# Patient Record
Sex: MALE | Race: BLACK OR AFRICAN AMERICAN | NOT HISPANIC OR LATINO | Employment: FULL TIME | URBAN - METROPOLITAN AREA
[De-identification: names, ages, dates, MRNs, and addresses within clinical notes are randomized per-mention and may not be internally consistent; named-entity substitution may affect disease eponyms.]

---

## 2018-04-25 ENCOUNTER — HOSPITAL ENCOUNTER (EMERGENCY)
Facility: HOSPITAL | Age: 63
Discharge: HOME | End: 2018-04-25
Attending: EMERGENCY MEDICINE
Payer: COMMERCIAL

## 2018-04-25 VITALS
WEIGHT: 220 LBS | BODY MASS INDEX: 28.23 KG/M2 | HEART RATE: 92 BPM | DIASTOLIC BLOOD PRESSURE: 63 MMHG | RESPIRATION RATE: 18 BRPM | TEMPERATURE: 99 F | SYSTOLIC BLOOD PRESSURE: 139 MMHG | OXYGEN SATURATION: 98 % | HEIGHT: 74 IN

## 2018-04-25 DIAGNOSIS — R09.81 SINUS CONGESTION: ICD-10-CM

## 2018-04-25 DIAGNOSIS — R53.1 WEAKNESS: ICD-10-CM

## 2018-04-25 DIAGNOSIS — G44.209 TENSION HEADACHE: Primary | ICD-10-CM

## 2018-04-25 LAB
ALBUMIN SERPL BCP-MCNC: 3.9 G/DL
ALP SERPL-CCNC: 133 U/L
ALT SERPL W/O P-5'-P-CCNC: 33 U/L
ANION GAP SERPL CALC-SCNC: 14 MMOL/L
AST SERPL-CCNC: 32 U/L
BASOPHILS # BLD AUTO: 0.02 K/UL
BASOPHILS NFR BLD: 0.2 %
BILIRUB SERPL-MCNC: 1.2 MG/DL
BUN SERPL-MCNC: 13 MG/DL
CALCIUM SERPL-MCNC: 9.6 MG/DL
CHLORIDE SERPL-SCNC: 97 MMOL/L
CO2 SERPL-SCNC: 24 MMOL/L
CREAT SERPL-MCNC: 1.4 MG/DL
DIFFERENTIAL METHOD: ABNORMAL
EOSINOPHIL # BLD AUTO: 0 K/UL
EOSINOPHIL NFR BLD: 0.1 %
ERYTHROCYTE [DISTWIDTH] IN BLOOD BY AUTOMATED COUNT: 12.7 %
EST. GFR  (AFRICAN AMERICAN): >60 ML/MIN/1.73 M^2
EST. GFR  (NON AFRICAN AMERICAN): 54 ML/MIN/1.73 M^2
GLUCOSE SERPL-MCNC: 90 MG/DL
HCT VFR BLD AUTO: 43.1 %
HGB BLD-MCNC: 14.7 G/DL
LYMPHOCYTES # BLD AUTO: 1.3 K/UL
LYMPHOCYTES NFR BLD: 14.3 %
MCH RBC QN AUTO: 32.6 PG
MCHC RBC AUTO-ENTMCNC: 34.1 G/DL
MCV RBC AUTO: 96 FL
MONOCYTES # BLD AUTO: 1 K/UL
MONOCYTES NFR BLD: 11.1 %
NEUTROPHILS # BLD AUTO: 6.6 K/UL
NEUTROPHILS NFR BLD: 74.3 %
PLATELET # BLD AUTO: 227 K/UL
PMV BLD AUTO: 9.5 FL
POTASSIUM SERPL-SCNC: 3.9 MMOL/L
PROT SERPL-MCNC: 7.6 G/DL
RBC # BLD AUTO: 4.51 M/UL
SODIUM SERPL-SCNC: 135 MMOL/L
WBC # BLD AUTO: 8.93 K/UL

## 2018-04-25 PROCEDURE — 99284 EMERGENCY DEPT VISIT MOD MDM: CPT

## 2018-04-25 PROCEDURE — 85025 COMPLETE CBC W/AUTO DIFF WBC: CPT

## 2018-04-25 PROCEDURE — 93010 ELECTROCARDIOGRAM REPORT: CPT | Mod: ,,, | Performed by: INTERNAL MEDICINE

## 2018-04-25 PROCEDURE — 80053 COMPREHEN METABOLIC PANEL: CPT

## 2018-04-25 PROCEDURE — 25000003 PHARM REV CODE 250: Performed by: EMERGENCY MEDICINE

## 2018-04-25 RX ORDER — BUTALBITAL, ACETAMINOPHEN AND CAFFEINE 50; 325; 40 MG/1; MG/1; MG/1
1 TABLET ORAL EVERY 4 HOURS PRN
COMMUNITY

## 2018-04-25 RX ORDER — HYDROCODONE BITARTRATE AND ACETAMINOPHEN 5; 325 MG/1; MG/1
1 TABLET ORAL
Status: DISCONTINUED | OUTPATIENT
Start: 2018-04-25 | End: 2018-04-25

## 2018-04-25 RX ORDER — MELOXICAM 7.5 MG/1
7.5 TABLET ORAL ONCE
Status: COMPLETED | OUTPATIENT
Start: 2018-04-25 | End: 2018-04-25

## 2018-04-25 RX ADMIN — MELOXICAM 7.5 MG: 7.5 TABLET ORAL at 02:04

## 2018-04-25 NOTE — ED PROVIDER NOTES
"Encounter Date: 4/25/2018    SCRIBE #1 NOTE: I, Yoko Gabriel, am scribing for, and in the presence of, Yvonne San MD.       History     Chief Complaint   Patient presents with    Headache     reports headache x 3 wks; [facility]; states yesterday blacked out, wife on phone states he was sent home for slurred speech yesterday     CC: Headache    HPI: This 62 y.o male who has PTSD, Chronic headache s/p TBI presents to the ED for an evaluation of an acute exacerbation of his chronic headaches, with current episode beginning this morning.  Patient reports for the past 3 weeks, he has been having intermittent frontal headaches, which he typically treats with his prescribed Fioricet by his PCP as the [facility].  Patient reports not taking his Fioricet today due to possible side effects.  Patient states he was informed that he "was talking out of his head" while at work on yesterday, which attributes to the Fioricet.  Patient states he was told that he threatened individuals, but reports he does not recall the incident.  Patient reports he also discontinued the medication as it makes him feel dizzy.  Patient reports has decreased appetite and generalized weakness associated with this.  He reports yesterday, while home alone, he sustained a mechanical fall after stumbling and reports lying on the floor for approximately 15-20 minutes.  Patient reports due to the fall, he has swelling to his left upper lip and right sided neck pain ( previously present before the fall). No prodrome of cp or palpitations. Patient denies fever, chills, emesis, diarrhea, loss of consciousness, abdominal pain, chest pain, shortness of breath, or any other associated symptoms.  No exacerbating factors.     Of note, the patient reports his PCP and psychiatrist located at the [facility]. He states he came here because he wanted to "get checked by a civilian hospital because [facility] are [facility]".      The history is provided by the patient. No  was used.     Review of patient's allergies indicates:  No Known Allergies  Past Medical History:   Diagnosis Date    Ear ringing 1981     Past Surgical History:   Procedure Laterality Date    SINUS SURGERY  2009     No family history on file.  Social History   Substance Use Topics    Smoking status: Former Smoker     Packs/day: 0.90     Years: 8.00     Types: Cigarettes    Smokeless tobacco: Not on file    Alcohol use Not on file     Review of Systems   Constitutional: Positive for appetite change. Negative for chills and fever.   HENT: Negative for ear pain and sore throat.         (+) upper lip swelling   Eyes: Negative for pain.   Respiratory: Negative for cough and shortness of breath.    Cardiovascular: Negative for chest pain.   Gastrointestinal: Negative for abdominal pain, diarrhea, nausea and vomiting.   Genitourinary: Negative for dysuria.   Musculoskeletal: Positive for neck pain. Negative for back pain.   Skin: Negative for rash.   Neurological: Positive for weakness and headaches.   All other systems reviewed and are negative.      Physical Exam     Initial Vitals [04/25/18 1215]   BP Pulse Resp Temp SpO2   -- 98 20 99.2 °F (37.3 °C) 99 %      MAP       --         Physical Exam    Nursing note and vitals reviewed.  Constitutional: Vital signs are normal. He appears well-developed and well-nourished. He is active.  Non-toxic appearance. No distress.   HENT:   Head: Normocephalic and atraumatic.   Right Ear: External ear normal.   Left Ear: External ear normal.   Nose: Nose normal.   Mouth/Throat: Oropharynx is clear and moist. No oropharyngeal exudate.   Patient has scabbing to the left lateral upper lip with mild swelling. No avulsed/loose teeth   Eyes: Conjunctivae and EOM are normal. Pupils are equal, round, and reactive to light.   Neck: Normal range of motion. Neck supple.   No midline spinal tenderness. Mild R lateral " cervical paraspinal tenderness.   Cardiovascular: Normal rate, regular rhythm and normal heart sounds. Exam reveals no gallop and no friction rub.    No murmur heard.  Pulmonary/Chest: Breath sounds normal. Stridor present. No respiratory distress. He has no wheezes. He has no rhonchi. He has no rales. He exhibits no tenderness.   Abdominal: Soft. Normal appearance and bowel sounds are normal. He exhibits no distension. There is no tenderness.   Musculoskeletal: Normal range of motion. He exhibits no edema.   Neurological: He is alert and oriented to person, place, and time. He has normal strength. No cranial nerve deficit or sensory deficit.   Patient exhibits no focal deficits.  Patient has a normal bilateral finger to nose test. Patient has a normal bilateral heel to shin test.   Skin: Skin is warm, dry and intact. No rash noted. No erythema.   Psychiatric: He has a normal mood and affect.         ED Course   Procedures  Labs Reviewed - No data to display  EKG Readings: (Independently Interpreted)   Initial Reading: No STEMI. Rhythm: Normal Sinus Rhythm. Heart Rate: 94. Ectopy: No Ectopy. Conduction: RBBB. ST Segments: Normal ST Segments. T Waves: Normal. Clinical Impression: Normal Sinus Rhythm with RBBB          Medical Decision Making:   History:   Old Medical Records: I decided to obtain old medical records.  Initial Assessment:   62 y.o male who has PTSD, Chronic headache s/p TBI presents to the ED for an evaluation of an acute exacerbation of his chronic headaches, with current episode beginning this morning.  Patient reports for the past 3 weeks, he has been having intermittent frontal headaches, which he typically treats with his [facility] Patient reports being non-compliant with his Fioricet secondary to side effects.  Exam consistent with chronic headaches, lip abrasion, possdible mediation side effect and likely mood/behavioral issues related to ptsd/tbi as  patient does not show signs of current psychosis   Differential Diagnosis:   Tension Headache, Intracranial hemorrhage, Metabolic derangement, ptsd, medication side effect, lip abrasion. ACS or cardiac cause not suspected.  Clinical Tests:   Lab Tests: Ordered and Reviewed  Radiological Study: Ordered and Reviewed  Medical Tests: Ordered and Reviewed  ED Management:  Patient will receive treatment for headache.  Due to recent fall, patient will receive head CT to rule out intracranial hemorrhage.  Disposition dependent upon results.  On reassessment, patient reports his symptoms has improved with mobic.  CT of head reveals saad nasal sinus disease, but no other acute changes.  Patient's symptoms are likely a result of his chronic tension headache.  Patient will be discharged [facility] changes there as patient states he will not take the fioricet any more.  Patient agrees with plan.            Scribe Attestation:   Scribe #1: I performed the above scribed service and the documentation accurately describes the services I performed. I attest to the accuracy of the note.    Attending Attestation:           Physician Attestation for Scribe:  Physician Attestation Statement for Scribe #1: I, Yvonne San MD, reviewed documentation, as scribed by Yoko Gabriel in my presence, and it is both accurate and complete.                    Clinical Impression:   The primary encounter diagnosis was Tension headache. Diagnoses of Sinus congestion and Weakness were also pertinent to this visit.                           Yvonne San MD  04/26/18 0253

## 2018-04-25 NOTE — ED TRIAGE NOTES
Pt arrived to ED via personal transport with c/o headache x 3 weeks. Pt states [facility], but medication is making him disoriented. He is AAO x 4 in no apparent distress at this time.

## 2024-10-02 ENCOUNTER — TELEPHONE (OUTPATIENT)
Dept: OPHTHALMOLOGY | Facility: CLINIC | Age: 69
End: 2024-10-02
Payer: OTHER GOVERNMENT

## 2024-10-02 NOTE — TELEPHONE ENCOUNTER
----- Message from Shamir Alvarado sent at 9/30/2024  4:43 PM CDT -----    ----- Message -----  From: Kalee Palomares  Sent: 9/30/2024   4:30 PM CDT  To: [facility] Staff      ----- Message -----  From: Bev Chambers  Sent: 9/30/2024   3:56 PM CDT  To: [facility] Clinical Support Staff    Good Afternoon,     [facility] would like to refer the following patient to the Ophthalmology department. 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 I have scanned the patients referral and records into . Please schedule     Thank you,    Bev   [facility]

## 2025-02-06 ENCOUNTER — OFFICE VISIT (OUTPATIENT)
Dept: OPHTHALMOLOGY | Facility: CLINIC | Age: 70
End: 2025-02-06
Payer: OTHER GOVERNMENT

## 2025-02-06 DIAGNOSIS — H02.835 DERMATOCHALASIS OF BOTH LOWER EYELIDS: ICD-10-CM

## 2025-02-06 DIAGNOSIS — H02.832 DERMATOCHALASIS OF BOTH LOWER EYELIDS: ICD-10-CM

## 2025-02-06 DIAGNOSIS — H02.132 SENILE ECTROPION OF BOTH LOWER EYELIDS: Primary | ICD-10-CM

## 2025-02-06 DIAGNOSIS — H02.135 SENILE ECTROPION OF BOTH LOWER EYELIDS: Primary | ICD-10-CM

## 2025-02-06 PROCEDURE — 99204 OFFICE O/P NEW MOD 45 MIN: CPT | Mod: 25,S$PBB,, | Performed by: OPHTHALMOLOGY

## 2025-02-06 PROCEDURE — 99999 PR PBB SHADOW E&M-EST. PATIENT-LVL II: CPT | Mod: PBBFAC,,, | Performed by: OPHTHALMOLOGY

## 2025-02-06 PROCEDURE — 68840 EXPLORE/IRRIGATE TEAR DUCTS: CPT | Mod: 50,PBBFAC | Performed by: OPHTHALMOLOGY

## 2025-02-06 PROCEDURE — 99212 OFFICE O/P EST SF 10 MIN: CPT | Mod: PBBFAC | Performed by: OPHTHALMOLOGY

## 2025-02-06 PROCEDURE — 68840 EXPLORE/IRRIGATE TEAR DUCTS: CPT | Mod: 50,S$PBB,, | Performed by: OPHTHALMOLOGY

## 2025-02-06 PROCEDURE — 92285 EXTERNAL OCULAR PHOTOGRAPHY: CPT | Mod: PBBFAC | Performed by: OPHTHALMOLOGY

## 2025-02-06 NOTE — PROGRESS NOTES
HPI    Wali Mendes is a/an 69 y.o. male here for orbit fat prolapse RLL   evaluation.   [facility]   [facility] Meds: ATS prn     Patient here for discomfort/itching and growth under RLL which has been   present for about a year and half       Last edited by Jenny Godoy on 2/6/2025  9:27 AM.            Assessment /Plan     For exam results, see Encounter Report.    Senile ectropion of both lower eyelids    Dermatochalasis of both lower eyelids      The patient is a pleasant 69-year-old male here for evaluation of bilateral irritation tearing more prominently on the right than the left.  This has been ongoing over the last 1-1/2-2 years.  The patient does not have any prior history of eyelid surgery or ocular surgery.  Patient has a remote history of trauma to the left orbit many years ago.  The patient is most bothered by the irritation on the right lower lid compared to the left.    On exam, the patient has mild frontalis use.  Bilateral brows are in good position.  He has mild bilateral upper eyelid dermatochalasis.  He has bilateral lower eyelid senile ectropion with severe lateral canthal laxity.  He has bilateral trace inferior exposure keratopathy.  He has bilateral lower eyelid dermatochalasis with herniation of the temporal fat pads bilaterally.      Irrigation:  OD:patent lower punctum, canaliculus, and nld with 100 % flow to the nose  OS:patent lower punctum, canaliculus, and nld with 100% flow to the nose       These findings were discussed with the patient.    Recommend bilateral lower eyelid canthoplasty with debulking of the temporal fat pads under general anesthesia.      Informed consent obtained after extensive risks/benefits/alternatives were discussed with the patient including but not limited to pain, bleeding, infection, ocular injury, loss of the eye, asymmetry, need for revision in future, scarring and numbness.  Alternatives such as waiting were discussed.  All questions were answered.        Hold ASA, NSAIDS, fish oil, Vitamin E and Multivitamins 5 to 7 days prior to procedure.     Return for surgery

## 2025-02-07 ENCOUNTER — TELEPHONE (OUTPATIENT)
Dept: OPHTHALMOLOGY | Facility: CLINIC | Age: 70
End: 2025-02-07
Payer: OTHER GOVERNMENT

## 2025-02-07 DIAGNOSIS — H02.132 SENILE ECTROPION OF BOTH LOWER EYELIDS: Primary | ICD-10-CM

## 2025-02-07 DIAGNOSIS — H02.135 SENILE ECTROPION OF BOTH LOWER EYELIDS: Primary | ICD-10-CM

## 2025-04-14 NOTE — PRE-PROCEDURE INSTRUCTIONS
Patient reviewed on 4/14/2025.  Okay to proceed at [facility]. The following pre-procedure instructions and arrival time have been reviewed with patient via phone and sent to patient portal for review.  Patient verbalized an understanding.  Pt to be accompanied by his wife day of procedure as responsible .      Dear Wali    Below you will find basic pre-procedure instructions in preparation for your procedure on 4/15/25 with Dr. Miles     Arrival time 7:45 am    - DO NOT EAT OR DRINK after midnight the night before your procedure  -HOLD all Diabetic meds night before and morning of procedure   -HOLD Fast acting Insulin (insulin taken with meals) morning of procedure  -HOLD all Fluid pills morning of procedure (Lasix, Hydrochlorothiazide/HCTZ)  - HOLD all non-insulin shots until after surgery (Ozempic, Mounjaro, Trulicity, Wegovy and Adlyxin) (7 days prior)  -HOLD all DAILY non-insulin injections (such as: Byetta, Victoza, Saxenda, Adlyxin, Semaglutide) morning of your procedure  -HOLD all vitamins, mineral and herbal supplements (including herbal teas) for a total of 7 days prior to surgery  -HOLD anything containing Vitamin E for a total of 7 days prior to your procedure  -TAKE all Blood Pressure medications morning of procedure, EXCEPT those that contain a fluid pill (such as: Hydrochlorothiazide (HCTZ), Lasix (Furosemide))  -USE inhalers as needed and bring AM of surgery    -Please stop taking NSAIDS 7 days before your procedure   Examples are:  Aspirin  Aleve   Advil   Goody Powder or BC   Ibuprofen  Motrin   Naproxen  Meloxicam  Mobic    -Please stop taking the following blood thinners 7 days prior to procedure:   Plavix   Lovenox   Pletal     -The following should all be stopped 3 days prior to procedure:  Coumadin  Warfarin  Rivaroxaban  Apixaban  Xarelto   Eliquis    *Please be sure to review the given medication RESTRICTION Sheet in your surgery folder regarding medications that you MUST STOP BEFORE  your surgery date.  *Please note that if you take any of the RESTRICTED medications on that list your surgery WILL HAVE TO BE RESCHEDULED!!!!  - Shower and wash face PM prior and AM of surgery  - No powder, lotions, creams, oils, gels, ointments, makeup,  or jewelry    - Wear comfortable clothing (button up shirt)     (Patient is required to have a responsible ride to transport home, you CAN NOT drive yourself home)     -[facility], 2nd floor Surgery Center, located   @ [address] [place] [address]  2nd Floor Registration bypass first floor desk      If you have any questions or concerns please feel free to contact your surgeon's office at [phone].    In the event that you are running late or need to reschedule on the day of your procedure, please contact the pre-op desk at [phone].        Please reply to this message as receipt of delivery.      JIGNA Shen  [phone]  [facility]  Pre-Admit - Anesthesia Dept

## 2025-04-15 ENCOUNTER — HOSPITAL ENCOUNTER (OUTPATIENT)
Facility: HOSPITAL | Age: 70
Discharge: HOME | End: 2025-04-15
Attending: OPHTHALMOLOGY | Admitting: OPHTHALMOLOGY
Payer: OTHER GOVERNMENT

## 2025-04-15 ENCOUNTER — ANESTHESIA (OUTPATIENT)
Dept: SURGERY | Facility: HOSPITAL | Age: 70
End: 2025-04-15
Payer: OTHER GOVERNMENT

## 2025-04-15 ENCOUNTER — ANESTHESIA EVENT (OUTPATIENT)
Dept: SURGERY | Facility: HOSPITAL | Age: 70
End: 2025-04-15
Payer: OTHER GOVERNMENT

## 2025-04-15 VITALS
RESPIRATION RATE: 14 BRPM | SYSTOLIC BLOOD PRESSURE: 147 MMHG | OXYGEN SATURATION: 98 % | HEART RATE: 70 BPM | TEMPERATURE: 98 F | DIASTOLIC BLOOD PRESSURE: 80 MMHG

## 2025-04-15 DIAGNOSIS — R07.9 CHEST PAIN: ICD-10-CM

## 2025-04-15 DIAGNOSIS — H02.132 SENILE ECTROPION OF BOTH LOWER EYELIDS: Primary | ICD-10-CM

## 2025-04-15 DIAGNOSIS — H02.835 DERMATOCHALASIS OF BOTH LOWER EYELIDS: ICD-10-CM

## 2025-04-15 DIAGNOSIS — H02.832 DERMATOCHALASIS OF BOTH LOWER EYELIDS: ICD-10-CM

## 2025-04-15 DIAGNOSIS — H02.135 SENILE ECTROPION OF BOTH LOWER EYELIDS: Primary | ICD-10-CM

## 2025-04-15 PROCEDURE — 71000016 HC POSTOP RECOV ADDL HR: Performed by: OPHTHALMOLOGY

## 2025-04-15 PROCEDURE — 99900035 HC TECH TIME PER 15 MIN (STAT)

## 2025-04-15 PROCEDURE — 25000003 PHARM REV CODE 250: Performed by: OPHTHALMOLOGY

## 2025-04-15 PROCEDURE — 63600175 PHARM REV CODE 636 W HCPCS: Performed by: OPHTHALMOLOGY

## 2025-04-15 PROCEDURE — 36000706: Performed by: OPHTHALMOLOGY

## 2025-04-15 PROCEDURE — 67917 REPAIR EYELID DEFECT: CPT | Mod: 50,,, | Performed by: OPHTHALMOLOGY

## 2025-04-15 PROCEDURE — 94761 N-INVAS EAR/PLS OXIMETRY MLT: CPT

## 2025-04-15 PROCEDURE — 25000003 PHARM REV CODE 250: Performed by: ANESTHESIOLOGY

## 2025-04-15 PROCEDURE — 25000003 PHARM REV CODE 250

## 2025-04-15 PROCEDURE — 37000008 HC ANESTHESIA 1ST 15 MINUTES: Performed by: OPHTHALMOLOGY

## 2025-04-15 PROCEDURE — 71000033 HC RECOVERY, INTIAL HOUR: Performed by: OPHTHALMOLOGY

## 2025-04-15 PROCEDURE — 25000003 PHARM REV CODE 250: Performed by: NURSE ANESTHETIST, CERTIFIED REGISTERED

## 2025-04-15 PROCEDURE — 37000009 HC ANESTHESIA EA ADD 15 MINS: Performed by: OPHTHALMOLOGY

## 2025-04-15 PROCEDURE — 63600175 PHARM REV CODE 636 W HCPCS: Performed by: NURSE ANESTHETIST, CERTIFIED REGISTERED

## 2025-04-15 PROCEDURE — 71000015 HC POSTOP RECOV 1ST HR: Performed by: OPHTHALMOLOGY

## 2025-04-15 PROCEDURE — 36000707: Performed by: OPHTHALMOLOGY

## 2025-04-15 PROCEDURE — 63600175 PHARM REV CODE 636 W HCPCS: Performed by: ANESTHESIOLOGY

## 2025-04-15 RX ORDER — LIDOCAINE HYDROCHLORIDE AND EPINEPHRINE 10; 20 UG/ML; MG/ML
INJECTION, SOLUTION INFILTRATION; PERINEURAL
Status: DISCONTINUED | OUTPATIENT
Start: 2025-04-15 | End: 2025-04-15 | Stop reason: HOSPADM

## 2025-04-15 RX ORDER — GABAPENTIN 300 MG/1
1 CAPSULE ORAL 3 TIMES DAILY
COMMUNITY
Start: 2025-03-20

## 2025-04-15 RX ORDER — ACETAMINOPHEN 500 MG
500 TABLET ORAL
COMMUNITY
Start: 2024-08-05

## 2025-04-15 RX ORDER — HYDROCHLOROTHIAZIDE 25 MG/1
12.5 TABLET ORAL
COMMUNITY
Start: 2025-03-20

## 2025-04-15 RX ORDER — HYDROCODONE BITARTRATE AND ACETAMINOPHEN 5; 325 MG/1; MG/1
1 TABLET ORAL EVERY 4 HOURS PRN
Status: DISCONTINUED | OUTPATIENT
Start: 2025-04-15 | End: 2025-04-15 | Stop reason: HOSPADM

## 2025-04-15 RX ORDER — LIDOCAINE HYDROCHLORIDE 20 MG/ML
INJECTION INTRAVENOUS
Status: DISCONTINUED | OUTPATIENT
Start: 2025-04-15 | End: 2025-04-15

## 2025-04-15 RX ORDER — ROCURONIUM BROMIDE 10 MG/ML
INJECTION, SOLUTION INTRAVENOUS
Status: DISCONTINUED | OUTPATIENT
Start: 2025-04-15 | End: 2025-04-15

## 2025-04-15 RX ORDER — FLUOXETINE HYDROCHLORIDE 20 MG/1
20 CAPSULE ORAL
COMMUNITY
Start: 2025-01-31

## 2025-04-15 RX ORDER — ATORVASTATIN CALCIUM 20 MG/1
20 TABLET, FILM COATED ORAL
COMMUNITY
Start: 2024-09-16

## 2025-04-15 RX ORDER — GLUCAGON 1 MG
1 KIT INJECTION
Status: DISCONTINUED | OUTPATIENT
Start: 2025-04-15 | End: 2025-04-15 | Stop reason: HOSPADM

## 2025-04-15 RX ORDER — OXYCODONE HYDROCHLORIDE 5 MG/1
5 TABLET ORAL
Status: DISCONTINUED | OUTPATIENT
Start: 2025-04-15 | End: 2025-04-15 | Stop reason: HOSPADM

## 2025-04-15 RX ORDER — TETRACAINE HYDROCHLORIDE 5 MG/ML
1 SOLUTION OPHTHALMIC
Status: DISCONTINUED | OUTPATIENT
Start: 2025-04-15 | End: 2025-04-15 | Stop reason: HOSPADM

## 2025-04-15 RX ORDER — CHOLECALCIFEROL (VITAMIN D3) 25 MCG
25 TABLET ORAL
COMMUNITY
Start: 2024-11-19

## 2025-04-15 RX ORDER — MIDAZOLAM HYDROCHLORIDE 1 MG/ML
INJECTION INTRAMUSCULAR; INTRAVENOUS
Status: DISCONTINUED | OUTPATIENT
Start: 2025-04-15 | End: 2025-04-15

## 2025-04-15 RX ORDER — PROCHLORPERAZINE EDISYLATE 5 MG/ML
5 INJECTION INTRAMUSCULAR; INTRAVENOUS EVERY 30 MIN PRN
Status: DISCONTINUED | OUTPATIENT
Start: 2025-04-15 | End: 2025-04-15 | Stop reason: HOSPADM

## 2025-04-15 RX ORDER — LIDOCAINE HYDROCHLORIDE 10 MG/ML
1 INJECTION, SOLUTION EPIDURAL; INFILTRATION; INTRACAUDAL; PERINEURAL ONCE
Status: DISCONTINUED | OUTPATIENT
Start: 2025-04-15 | End: 2025-04-15 | Stop reason: HOSPADM

## 2025-04-15 RX ORDER — PRAZOSIN HYDROCHLORIDE 2 MG/1
2 CAPSULE ORAL
COMMUNITY
Start: 2025-01-31

## 2025-04-15 RX ORDER — ONDANSETRON HYDROCHLORIDE 2 MG/ML
4 INJECTION, SOLUTION INTRAVENOUS ONCE AS NEEDED
Status: DISCONTINUED | OUTPATIENT
Start: 2025-04-15 | End: 2025-04-15 | Stop reason: HOSPADM

## 2025-04-15 RX ORDER — ONDANSETRON HYDROCHLORIDE 2 MG/ML
INJECTION, SOLUTION INTRAVENOUS
Status: DISCONTINUED | OUTPATIENT
Start: 2025-04-15 | End: 2025-04-15

## 2025-04-15 RX ORDER — HYDROCODONE BITARTRATE AND ACETAMINOPHEN 5; 325 MG/1; MG/1
1 TABLET ORAL EVERY 6 HOURS PRN
Qty: 16 TABLET | Refills: 0 | Status: SHIPPED | OUTPATIENT
Start: 2025-04-15

## 2025-04-15 RX ORDER — TOBRAMYCIN AND DEXAMETHASONE 3; 1 MG/ML; MG/ML
SUSPENSION/ DROPS OPHTHALMIC
Status: DISCONTINUED | OUTPATIENT
Start: 2025-04-15 | End: 2025-04-15 | Stop reason: HOSPADM

## 2025-04-15 RX ORDER — BUPIVACAINE HYDROCHLORIDE 5 MG/ML
INJECTION, SOLUTION EPIDURAL; INTRACAUDAL; PERINEURAL
Status: DISCONTINUED | OUTPATIENT
Start: 2025-04-15 | End: 2025-04-15 | Stop reason: HOSPADM

## 2025-04-15 RX ORDER — TETRACAINE HYDROCHLORIDE 5 MG/ML
1 SOLUTION OPHTHALMIC
Status: DISCONTINUED | OUTPATIENT
Start: 2025-04-15 | End: 2025-04-15

## 2025-04-15 RX ORDER — ACETAMINOPHEN 325 MG/1
650 TABLET ORAL EVERY 4 HOURS PRN
Status: DISCONTINUED | OUTPATIENT
Start: 2025-04-15 | End: 2025-04-15 | Stop reason: HOSPADM

## 2025-04-15 RX ORDER — PHENYLEPHRINE HYDROCHLORIDE 10 MG/ML
INJECTION INTRAVENOUS
Status: DISCONTINUED | OUTPATIENT
Start: 2025-04-15 | End: 2025-04-15

## 2025-04-15 RX ORDER — KETOROLAC TROMETHAMINE 30 MG/ML
15 INJECTION, SOLUTION INTRAMUSCULAR; INTRAVENOUS EVERY 8 HOURS PRN
Status: DISCONTINUED | OUTPATIENT
Start: 2025-04-15 | End: 2025-04-15 | Stop reason: HOSPADM

## 2025-04-15 RX ORDER — SODIUM CHLORIDE 0.9 % (FLUSH) 0.9 %
10 SYRINGE (ML) INJECTION
Status: DISCONTINUED | OUTPATIENT
Start: 2025-04-15 | End: 2025-04-15 | Stop reason: HOSPADM

## 2025-04-15 RX ORDER — NALTREXONE HYDROCHLORIDE 50 MG/1
50 TABLET, FILM COATED ORAL
COMMUNITY
Start: 2025-01-13

## 2025-04-15 RX ORDER — FENTANYL CITRATE 50 UG/ML
INJECTION, SOLUTION INTRAMUSCULAR; INTRAVENOUS
Status: DISCONTINUED | OUTPATIENT
Start: 2025-04-15 | End: 2025-04-15

## 2025-04-15 RX ORDER — TOBRAMYCIN AND DEXAMETHASONE 3; 1 MG/ML; MG/ML
1-2 SUSPENSION/ DROPS OPHTHALMIC 4 TIMES DAILY
Start: 2025-04-15 | End: 2025-04-25

## 2025-04-15 RX ORDER — MORPHINE SULFATE 2 MG/ML
2 INJECTION, SOLUTION INTRAMUSCULAR; INTRAVENOUS EVERY 5 MIN PRN
Status: DISCONTINUED | OUTPATIENT
Start: 2025-04-15 | End: 2025-04-15 | Stop reason: HOSPADM

## 2025-04-15 RX ORDER — DEXAMETHASONE SODIUM PHOSPHATE 4 MG/ML
INJECTION, SOLUTION INTRA-ARTICULAR; INTRALESIONAL; INTRAMUSCULAR; INTRAVENOUS; SOFT TISSUE
Status: DISCONTINUED | OUTPATIENT
Start: 2025-04-15 | End: 2025-04-15

## 2025-04-15 RX ORDER — PROPOFOL 10 MG/ML
VIAL (ML) INTRAVENOUS
Status: DISCONTINUED | OUTPATIENT
Start: 2025-04-15 | End: 2025-04-15

## 2025-04-15 RX ADMIN — SUGAMMADEX 200 MG: 100 INJECTION, SOLUTION INTRAVENOUS at 10:04

## 2025-04-15 RX ADMIN — ROCURONIUM BROMIDE 50 MG: 10 INJECTION INTRAVENOUS at 09:04

## 2025-04-15 RX ADMIN — PHENYLEPHRINE HYDROCHLORIDE 100 MCG: 10 INJECTION INTRAVENOUS at 10:04

## 2025-04-15 RX ADMIN — DEXAMETHASONE SODIUM PHOSPHATE 4 MG: 4 INJECTION INTRA-ARTICULAR; INTRALESIONAL; INTRAMUSCULAR; INTRAVENOUS; SOFT TISSUE at 09:04

## 2025-04-15 RX ADMIN — LIDOCAINE HYDROCHLORIDE 100 MG: 20 INJECTION INTRAVENOUS at 09:04

## 2025-04-15 RX ADMIN — MIDAZOLAM HYDROCHLORIDE 2 MG: 1 INJECTION, SOLUTION INTRAMUSCULAR; INTRAVENOUS at 09:04

## 2025-04-15 RX ADMIN — PHENYLEPHRINE HYDROCHLORIDE 100 MCG: 10 INJECTION INTRAVENOUS at 09:04

## 2025-04-15 RX ADMIN — FENTANYL CITRATE 100 MCG: 50 INJECTION, SOLUTION INTRAMUSCULAR; INTRAVENOUS at 09:04

## 2025-04-15 RX ADMIN — ONDANSETRON 4 MG: 2 INJECTION INTRAMUSCULAR; INTRAVENOUS at 10:04

## 2025-04-15 RX ADMIN — SODIUM CHLORIDE: 0.9 INJECTION, SOLUTION INTRAVENOUS at 09:04

## 2025-04-15 RX ADMIN — PROPOFOL 150 MG: 10 INJECTION, EMULSION INTRAVENOUS at 09:04

## 2025-04-15 NOTE — DISCHARGE SUMMARY
Discharge Summary  Ophthalmology Service    Admit Date: 4/15/2025     Discharge Date: 4/15/2025     Attending Physician: Kristal Miles MD     Discharge Physician: Oc Mcfarland III, MD    Discharged Condition: Stable    Reason for Admission: Senile ectropion of both lower eyelids [H02.132, H02.135]     Treatments/Procedures: Procedure(s) (LRB):  REPAIR, ECTROPION, EYELID (Bilateral) (see dictated report for details).    Hospital Course: Stable    Consults: None    Significant Diagnostic Studies: None    Disposition: Home    Patient Instructions:   - Resume same diet as prior to surgery  - Limit activity, no heavy lifting  - Call MD for severe uncontrolled pain, redness, and/or purulent drainage  - Follow up with Dr. Miles at [facility], 10th floor, in 7-10 days - Please call clinic to schedule the above  - Use cold compresses twice a day for the first 48 hours after surgery; then use warm compresses twice a day for the following 48 hours  - Use Tobradex ointment three times per day on the surgical wounds. Apply using clean hands or a clean Q-tip  - Use Tobradex eye drops four times a day to the both eyes.    Patient Instructions:   Current Discharge Medication List        START taking these medications    Details   HYDROcodone-acetaminophen (NORCO) 5-325 mg per tablet Take 1 tablet by mouth every 6 (six) hours as needed for Pain.  Qty: 16 tablet, Refills: 0    Comments: Quantity prescribed more than 7 day supply? No  Associated Diagnoses: Senile ectropion of both lower eyelids      tobramycin-dexAMETHasone 0.3-0.1% (TOBRADEX) 0.3-0.1 % DrpS Place 1-2 drops into both eyes 4 (four) times daily. for 10 days      tobramycin-dexAMETHasone 0.3-0.1% (TOBRADEX) 0.3-0.1 % Oint Place into both eyes 3 (three) times daily. Place a 1/2 inch ribbon of ointment into the lower eyelid. for 10 days           CONTINUE these medications which have NOT CHANGED    Details   acetaminophen (TYLENOL) 500 MG tablet  Take 500 mg by mouth.      atorvastatin (LIPITOR) 20 MG tablet Take 20 mg by mouth.      FLUoxetine 20 MG capsule Take 20 mg by mouth.      gabapentin (NEURONTIN) 300 MG capsule Take 1 capsule by mouth 3 (three) times daily.      hydroCHLOROthiazide (HYDRODIURIL) 25 MG tablet Take 12.5 mg by mouth.      naltrexone (DEPADE) 50 mg tablet Take 50 mg by mouth.      prazosin (MINIPRESS) 2 MG Cap Take 2 mg by mouth.      butalbital-acetaminophen-caffeine -40 mg (FIORICET, ESGIC) -40 mg per tablet Take 1 tablet by mouth every 4 (four) hours as needed for Pain.      CALCIUM CARBONATE/VITAMIN D3 (VITAMIN D-3 ORAL) Take by mouth. Pt take 1 tab if vit d3 every other day      CYANOCOBALAMIN/THIAMINE HCL (ANAM-B-12 ORAL) Take by mouth.      fish oil-omega-3 fatty acids 300-1,000 mg capsule Take 2 g by mouth once daily.      multivit-min-FA-K-lycopene (ONE-A-DAY MEN'S 50+ ADVANTAGE) 400- mcg Tab Take by mouth.      vitamin D (VITAMIN D3) 1000 units Tab Take 25 mcg by mouth.      vitamin E 400 UNIT capsule Take 400 Units by mouth once daily.             Discharge Procedure Orders   Diet general     Ice to affected area     Lifting restrictions     Call MD for:  temperature >100.4     Call MD for:  persistent nausea and vomiting     Call MD for:  severe uncontrolled pain     Call MD for:  difficulty breathing, headache or visual disturbances     Call MD for:  redness, tenderness, or signs of infection (pain, swelling, redness, odor or green/yellow discharge around incision site)     Call MD for:  hives     Call MD for:  persistent dizziness or light-headedness     Call MD for:  extreme fatigue

## 2025-04-15 NOTE — BRIEF OP NOTE
Brief Operative Note  Ophthalmology Service    Date of Procedure: 4/15/2025     Attending Physician: Kristal Miles MD    Resident: Oc Mcfarland III, MD    Pre-Operative Diagnosis: Senile ectropion of both lower eyelids [H02.132, H02.135]     Post-Operative Diagnosis: Same as pre-operative diagnosis    Treatments/Procedures:   Procedure(s) (LRB):  REPAIR, ECTROPION, EYELID (Bilateral)    Intraoperative Findings: lower eyelid ectropion, bilateral    Anesthesia: General    Complications: None    Estimated Blood Loss: < 5 cc    Specimens: None    -------------------------------------------------------------  Full dictated Operative Report to follow.  -------------------------------------------------------------

## 2025-04-15 NOTE — ANESTHESIA PREPROCEDURE EVALUATION
04/15/2025  Wali Mendes is a 69 y.o., male.  Past Medical History:   Diagnosis Date    Ear ringing 1981    Hyperlipidemia     Hypertension      Past Surgical History:   Procedure Laterality Date    FOOT SURGERY      SINUS SURGERY  2009     Pre-op Assessment       I have reviewed the Medications.     Review of Systems  Anesthesia Hx:  No problems with previous Anesthesia             Denies Family Hx of Anesthesia complications.     Social:  Former Smoker, Alcohol Use       Cardiovascular:     Hypertension                                          Neurological:      Headaches                                     Physical Exam  General: Well nourished    Airway:  Mallampati: II   TM Distance: Normal  Neck ROM: Normal ROM    Dental:  Intact    Chest/Lungs:  Clear to auscultation    Heart:  Rate: Normal  Rhythm: Regular Rhythm        Anesthesia Plan  Type of Anesthesia, risks & benefits discussed:    Anesthesia Type: Gen ETT  Intra-op Monitoring Plan: Standard ASA Monitors  Post Op Pain Control Plan: multimodal analgesia  Induction:  IV  Informed Consent: Informed consent signed with the Patient and all parties understand the risks and agree with anesthesia plan.  All questions answered.   ASA Score: 2    Ready For Surgery From Anesthesia Perspective.     .

## 2025-04-15 NOTE — PLAN OF CARE
RN spoke with Ms Mcintyre to verify ride home after procedure and updated her on the procedural timeline. She verbalized understanding and text system verification received.

## 2025-04-15 NOTE — H&P
"Pre-Operative History & Physical  Ophthalmology      SUBJECTIVE:     History of Present Illness:  Patient is a 69 y.o. male presents with Senile ectropion of both lower eyelids [H02.132, H02.135].    MEDICATIONS:   No medications prior to admission.       ALLERGIES: Review of patient's allergies indicates:  No Known Allergies    PAST MEDICAL HISTORY:   Past Medical History:   Diagnosis Date    Ear ringing 1981    Hyperlipidemia     Hypertension      PAST SURGICAL HISTORY:   Past Surgical History:   Procedure Laterality Date    FOOT SURGERY      SINUS SURGERY  2009     PAST FAMILY HISTORY: No family history on file.  SOCIAL HISTORY: Social History[1]     MENTAL STATUS: Alert    REVIEW OF SYSTEMS: Negative    OBJECTIVE:     Vital Signs (Most Recent)       Physical Exam:  General: NAD  HEENT: bilateral ectropion of the lower eyelids  Lungs: Adequate respirations, symmetrical movements, non-labored  Heart: Intact distal pulses  Abdomen: Soft, nondistended, nontender    ASSESSMENT/PLAN:     Patient is a 69 y.o. male with Senile ectropion of both lower eyelids [H02.132, H02.135].    - Plan for surgical correction with lower eyelid ectropion repair (tarsal strip) and removal of lateral fat pads of both eyes (OU).   - Allergies reviewed: Review of patient's allergies indicates:  No Known Allergies  - Has not taken blood thinners (includes ASA, NSAIDS, BC Powder, Goody's Powder, Excedrine, Eliquis, Xarelto, Pradaxa, etc.) for over 5 days.   - Risks/benefits/alternatives of the procedure including, but not limited to scarring, bleeding, infection, loss or decreased vision, and/or need for possible repeat surgery discussed with the patient and family.  - Informed consent obtained prior to surgery and the patient/family voiced good understanding.    "Audra Mcfarland III, MD  [facility], PGY3         [1]   Social History  Tobacco Use    Smoking status: Former     Current packs/day: 0.90     Average packs/day: " 0.9 packs/day for 8.0 years (7.2 ttl pk-yrs)     Types: Cigarettes    Smokeless tobacco: Never   Substance Use Topics    Alcohol use: Yes     Comment: occasional    Drug use: No

## 2025-04-15 NOTE — ANESTHESIA POSTPROCEDURE EVALUATION
Anesthesia Post Evaluation    Patient: Wali Mendes    Procedure(s) Performed: Procedure(s) (LRB):  REPAIR, ECTROPION, EYELID (Bilateral)    Final Anesthesia Type: general      Patient location during evaluation: PACU  Patient participation: Yes- Able to Participate  Level of consciousness: awake and alert, oriented and awake  Post-procedure vital signs: reviewed and stable  Pain management: adequate  Airway patency: patent  ANANYA mitigation strategies: Multimodal analgesia, Extubation while patient is awake and Verification of full reversal of neuromuscular block  PONV status at discharge: No PONV  Anesthetic complications: no      Cardiovascular status: hemodynamically stable  Respiratory status: spontaneous ventilation and room air  Hydration status: euvolemic  Follow-up not needed.              Vitals Value Taken Time   /80 04/15/25 12:32   Temp 36.4 °C (97.6 °F) 04/15/25 11:03   Pulse 69 04/15/25 12:35   Resp 15 04/15/25 12:35   SpO2 98 % 04/15/25 12:35   Vitals shown include unfiled device data.      Event Time   Out of Recovery 11:14:00         Pain/Evaristo Score: Evaristo Score: 10 (4/15/2025 12:30 PM)

## 2025-04-15 NOTE — TRANSFER OF CARE
Anesthesia Transfer of Care Note    Patient: Wali Mendes    Procedure(s) Performed: Procedure(s) (LRB):  REPAIR, ECTROPION, EYELID (Bilateral)    Patient location: PACU    Anesthesia Type: general    Transport from OR: Transported from OR on 6-10 L/min O2 by face mask with adequate spontaneous ventilation    Post pain: adequate analgesia    Post assessment: no apparent anesthetic complications and tolerated procedure well    Post vital signs: stable    Level of consciousness: sedated    Nausea/Vomiting: no nausea/vomiting    Complications: none    Transfer of care protocol was followed    Last vitals: Visit Vitals  /65   Pulse 81   Temp 36.6 °C (97.9 °F) (Temporal)   Resp 20   SpO2 99%

## 2025-04-15 NOTE — OP NOTE
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 Patient understands the risk of pain, bleeding, infection, ocular injury, loss of the eye, asymmetry, need for revision in future, scarring.      Procedure in detail:  Risks, benefits, alternatives, and complications were discussed thoroughly with the patient and the patient expressed understanding and a desire to proceed with the procedure. Informed consent was obtained, signed, and witnessed, and placed in the chart prior.     The patient was brought to the operating room and placed in supine position.  A time out was performed including patient's name, date of birth, allergies, surgical site location, and anticipated procedure.  After adequate anesthesia was achieved, approximately 6 cc of subcutaneous lidocaine 2% with epinephrine 1:100,000, 0.5% Bupivacaine, and Hylenex were injected near the bilateral orbital rims, the lateral aspects of the upper and lower eyelids, and into the inferior fornix. The full face was prepped and draped in sterile fashion using topical Betadine.     Attention was first turned to the right eye. The lateral canthal incision was performed using a #15 scalpel. Straight Sánchez scissors were used to perform a canthotomy, and  monopolar cautery was used for the cantholysis. Hemostasis was maintained. The inferior orbital septum was incised using monopolar cautery and the lateral orbital fat pad was teased out. The fat pad was excised at its based using monopolar cautery in a 360 degree manner to ensure hemostasis. The eyelid was held on tension in the superior and posterior vector. The redundant tissue was excised in a full-thickness manner.  A 4-0 Vicryl on P2 was placed anterior inferiorly exiting posterior superiorly through the tarsus and then temporarily tied at the level of the superior porfirio 2-3 mm posterior to the zygoma.  The contour and position of the lower eyelid were compared to the contralateral side.  Adjustments were made to allow for symmetry between the two sides.  The temporary tie was then undone and passed back through the periosteum 2 mm inferior to the initial pass and then tied tightly cinching the lid down to the periosteum.  The gray lines of the lateral upper and lower eyelids were reapposed using 6-0 vicryl on S-14. The procedure was repeated for the left eye.      The lateral canthal incision was closed with a running 6-0 Prolene on a P-1 needle. Tobradex ointment was applied to the eye and eyelid. Tobradex ophthalmic suspension was placed in both eyes.    The patient tolerated the procedure well. The patient was awakened and transported to the recovery room in stable fashion. Post-operative instructions were discussed with the patient and, if present, the family members.  All questions were answered.  The patient was discharged home in stable condition.

## 2025-04-15 NOTE — PLAN OF CARE
Pt in preop bay 42, VSS, and IV inserted. Pt denies any open wounds on body or the use of any weight loss injections. Pt needs anesthesia consents signed, otherwise ready to roll.  Procedural consents verified with pt.

## 2025-04-15 NOTE — ANESTHESIA PROCEDURE NOTES
Intubation    Date/Time: 4/15/2025 9:12 AM    Performed by: Denise Mcbride CRNA  Authorized by: Jossie Bernstein MD    Intubation:     Induction:  Intravenous    Intubated:  Postinduction    Mask Ventilation:  Easy mask    Attempts:  1    Attempted By:  CRNA    Method of Intubation:  Video laryngoscopy    Blade:  Kelly 3    Laryngeal View Grade: Grade I - full view of cords      Difficult Airway Encountered?: No      Complications:  None    Airway Device:  Oral endotracheal tube    Airway Device Size:  7.5    Style/Cuff Inflation:  Cuffed    Inflation Amount (mL):  8    Tube secured:  22    Secured at:  The lips    Placement Verified By:  Capnometry    Complicating Factors:  None    Findings Post-Intubation:  BS equal bilateral and atraumatic/condition of teeth unchanged

## 2025-04-24 ENCOUNTER — OFFICE VISIT (OUTPATIENT)
Dept: OPHTHALMOLOGY | Facility: CLINIC | Age: 70
End: 2025-04-24
Payer: OTHER GOVERNMENT

## 2025-04-24 DIAGNOSIS — H02.135 SENILE ECTROPION OF BOTH LOWER EYELIDS: ICD-10-CM

## 2025-04-24 DIAGNOSIS — H02.132 SENILE ECTROPION OF BOTH LOWER EYELIDS: ICD-10-CM

## 2025-04-24 DIAGNOSIS — H02.832 DERMATOCHALASIS OF BOTH LOWER EYELIDS: ICD-10-CM

## 2025-04-24 DIAGNOSIS — Z98.890 POST-OPERATIVE STATE: Primary | ICD-10-CM

## 2025-04-24 DIAGNOSIS — H02.835 DERMATOCHALASIS OF BOTH LOWER EYELIDS: ICD-10-CM

## 2025-04-24 PROCEDURE — 99999 PR PBB SHADOW E&M-EST. PATIENT-LVL III: CPT | Mod: PBBFAC,,, | Performed by: OPHTHALMOLOGY

## 2025-04-24 PROCEDURE — 99213 OFFICE O/P EST LOW 20 MIN: CPT | Mod: PBBFAC | Performed by: OPHTHALMOLOGY

## 2025-04-24 PROCEDURE — 92285 EXTERNAL OCULAR PHOTOGRAPHY: CPT | Mod: PBBFAC | Performed by: OPHTHALMOLOGY

## 2025-04-24 NOTE — PROGRESS NOTES
HPI    Wali Mendes is a/an 69 y.o. male here for 1 week post op.  Date of procedure: 4/15/2025  Procedure: REPAIR, ECTROPION, EYELID (Bilateral)   Oral antibiotics: none    Eye meds: maxitrol benito // gtts     Patient doing well following procedure. No pain or discomfort.     Last edited by Jordan Cam on 4/24/2025  3:00 PM.            Assessment /Plan     For exam results, see Encounter Report.    Post-operative state  -     External Photography - OU - Both Eyes    Senile ectropion of both lower eyelids    Dermatochalasis of both lower eyelids      Patient doing well! Post-operative instructions reviewed. Sutures removed. All questions answered.  Return in 5 weeks or sooner any worsening of vision/symptoms or any concerns.

## (undated) DEVICE — TOWEL OR XRAY BLUE 17X26IN

## (undated) DEVICE — PROTECTOR CORNEAL CROUCH ST

## (undated) DEVICE — PENCIL ROCKER SWITCH 10FT CORD

## (undated) DEVICE — SUT VICRYL ANTI COAT 4-0 18IN

## (undated) DEVICE — SOL BETADINE 5%

## (undated) DEVICE — MARKER SKIN RULER STERILE

## (undated) DEVICE — INSTRUMENT SURG SUCT FRZR W/C

## (undated) DEVICE — TUBING SUC UNIV W/CONN 12FT

## (undated) DEVICE — CLEANER TIP ELECSURG 2X2IN

## (undated) DEVICE — DRAPE STERI INSTRUMENT 1018

## (undated) DEVICE — DRAPE EENT SPLIT STERILE

## (undated) DEVICE — SPONGE COTTON TRAY 4X4IN

## (undated) DEVICE — SUT CTD VICRYL 6-0 S-14

## (undated) DEVICE — BLADE SURG #15 CARBON STEEL

## (undated) DEVICE — CONTAINER SPECIMEN OR STER 4OZ

## (undated) DEVICE — GLOVE BIOGEL PI MICRO SZ 7.5

## (undated) DEVICE — SOL BSS BALANCED SALT

## (undated) DEVICE — SPONGE GAUZE 16PLY 4X4

## (undated) DEVICE — SUT 6/0 18IN PROLENE BL MO

## (undated) DEVICE — SOL IRRI STRL WATER 1000ML

## (undated) DEVICE — SUT SILK 4-0 BLK BR G-3G-3

## (undated) DEVICE — NDL HYPO A BEVEL 30X1/2

## (undated) DEVICE — SYR 10CC LUER LOCK

## (undated) DEVICE — GOWN ECLIPSE REINF LVL4 TWL XL

## (undated) DEVICE — TRAY MUSCLE LID EYE

## (undated) DEVICE — DRAPE THREE-QUARTER 53X77IN

## (undated) DEVICE — DRAPE STERI-DRAPE 1000 17X11IN

## (undated) DEVICE — ELECTRODE REM PLYHSV RETURN 9

## (undated) DEVICE — NDL STRAIGHT 4CM LEIBINGER